# Patient Record
Sex: FEMALE | Race: WHITE | NOT HISPANIC OR LATINO | Employment: STUDENT | URBAN - METROPOLITAN AREA
[De-identification: names, ages, dates, MRNs, and addresses within clinical notes are randomized per-mention and may not be internally consistent; named-entity substitution may affect disease eponyms.]

---

## 2024-03-09 ENCOUNTER — HOSPITAL ENCOUNTER (EMERGENCY)
Facility: CLINIC | Age: 19
Discharge: HOME | End: 2024-03-10
Attending: EMERGENCY MEDICINE | Admitting: EMERGENCY MEDICINE
Payer: MEDICAID

## 2024-03-09 DIAGNOSIS — F41.0 ANXIETY ATTACK: ICD-10-CM

## 2024-03-09 LAB
HOLD SPECIMEN: NORMAL

## 2024-03-09 PROCEDURE — 99285 EMERGENCY DEPT VISIT HI MDM: CPT | Performed by: EMERGENCY MEDICINE

## 2024-03-09 PROCEDURE — 99284 EMERGENCY DEPT VISIT MOD MDM: CPT | Mod: FS | Performed by: EMERGENCY MEDICINE

## 2024-03-09 PROCEDURE — 93005 ELECTROCARDIOGRAM TRACING: CPT | Performed by: EMERGENCY MEDICINE

## 2024-03-09 PROCEDURE — 250N000013 HC RX MED GY IP 250 OP 250 PS 637

## 2024-03-09 PROCEDURE — 93010 ELECTROCARDIOGRAM REPORT: CPT

## 2024-03-09 RX ORDER — HYDROXYZINE HYDROCHLORIDE 25 MG/1
25 TABLET, FILM COATED ORAL ONCE
Status: COMPLETED | OUTPATIENT
Start: 2024-03-09 | End: 2024-03-09

## 2024-03-09 RX ORDER — HYDROXYZINE HYDROCHLORIDE 25 MG/1
25 TABLET, FILM COATED ORAL EVERY 6 HOURS PRN
Qty: 30 TABLET | Refills: 0 | Status: SHIPPED | OUTPATIENT
Start: 2024-03-09

## 2024-03-09 RX ADMIN — HYDROXYZINE HYDROCHLORIDE 25 MG: 25 TABLET, FILM COATED ORAL at 21:14

## 2024-03-09 ASSESSMENT — COLUMBIA-SUICIDE SEVERITY RATING SCALE - C-SSRS
3. HAVE YOU BEEN THINKING ABOUT HOW YOU MIGHT KILL YOURSELF?: YES
5. HAVE YOU STARTED TO WORK OUT OR WORKED OUT THE DETAILS OF HOW TO KILL YOURSELF? DO YOU INTEND TO CARRY OUT THIS PLAN?: NO
6. HAVE YOU EVER DONE ANYTHING, STARTED TO DO ANYTHING, OR PREPARED TO DO ANYTHING TO END YOUR LIFE?: NO
1. IN THE PAST MONTH, HAVE YOU WISHED YOU WERE DEAD OR WISHED YOU COULD GO TO SLEEP AND NOT WAKE UP?: YES
2. HAVE YOU ACTUALLY HAD ANY THOUGHTS OF KILLING YOURSELF IN THE PAST MONTH?: YES
4. HAVE YOU HAD THESE THOUGHTS AND HAD SOME INTENTION OF ACTING ON THEM?: YES

## 2024-03-09 ASSESSMENT — ACTIVITIES OF DAILY LIVING (ADL)
ADLS_ACUITY_SCORE: 33
ADLS_ACUITY_SCORE: 35

## 2024-03-10 VITALS
DIASTOLIC BLOOD PRESSURE: 58 MMHG | RESPIRATION RATE: 16 BRPM | OXYGEN SATURATION: 98 % | HEART RATE: 75 BPM | TEMPERATURE: 98.1 F | SYSTOLIC BLOOD PRESSURE: 102 MMHG

## 2024-03-10 PROBLEM — F41.0 PANIC ATTACK: Status: ACTIVE | Noted: 2024-03-10

## 2024-03-10 ASSESSMENT — ACTIVITIES OF DAILY LIVING (ADL): ADLS_ACUITY_SCORE: 35

## 2024-03-10 NOTE — CONSULTS
"Diagnostic Evaluation Consultation  Crisis Assessment    Patient Name: Ke Newton  Age:  18 year old  Legal Sex: female  Gender Identity: female  Pronouns:   Race: White  Ethnicity: Not  or   Language: English      Patient was assessed: Virtual: [employer] Crisis Assessment Start Time: 2356 Crisis Assessment Stop Time: 0040  Patient location: [facility] EMERGENCY DEPARTMENT                                 Referral Data and Chief Complaint  Ke Nweton presents to the ED by  self. Patient is presenting to the ED for the following concerns: Anxiety.   Factors that make the mental health crisis life threatening or complex are:  Pt presents to the ED with anxiety and chest pain. She reports having a panic attack today due to roommate problems. She states that her anxiety worsening for the past several months is most notably due to being away from her family while she is at college.  Pt reports that overall sleep is okay however sometimes she has nausea when anxious, which causes difficulty sleeping.  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 She reports having a low appetite however is eating enough to maintain nutrition.  She uses alcohol approximately 2x per month and denies binge drinking.  She denies SIB, HI, and psychosis.  She endorses thoughts about wishing to escape when she has panic attacks, however denies SI, stating that she does not want to die.  She denies plan or intent of suicide.  Pt states that she generally gets along with her 2 roommates, however today they made her \"feel bad about myself\".  She states that she thinks the worse case scenario and sometimes feels shut out by her roommates.  Her family resides in [place], [place] and she reports they are supportive. She identifies her mother as someone she can call in a crisis for help.      Informed Consent " and Assessment Methods  Explained the crisis assessment process, including applicable information disclosures and limits to confidentiality, assessed understanding of the process, and obtained consent to proceed with the assessment.  Assessment methods included conducting a formal interview with patient, review of medical records, collaboration with medical staff, and obtaining relevant collateral information from family and community providers when available.  : done     Patient response to interventions: acceptance expressed, verbalizes understanding  Coping skills were attempted to reduce the crisis:  Pt called her mother prior to coming to the ED.  Pt came to the ED alone for help. She reports that going for walks helps.     History of the Crisis   Pt has no history of ED encounters for mental health symptoms. She has never been hospitalized for mental health. She recently started working with a therapist at [facility] and has had 3 sessions. She would like to work with another community therapist instead. She is prescribed lexapro by her PCP and wants to continue working with her PCP for medication management.  She reports taking medication as prescribed.      Brief Psychosocial History  Family:  Single, Children no  Support System:  Parent(s)  Employment Status:  employed part-time  Source of Income:  salary/wages  Financial Environmental Concerns:  none  Current Hobbies:  exercise/fitness       Significant Clinical History  Current Anxiety Symptoms:  panic attack, excessive worry, anxious (Arrives to the ED with chest pain)  Current Depression/Trauma:   (thoughts about wishing to escape, during panic attacks)  Current Somatic Symptoms:  anxious, excessive worry  Current Psychosis/Thought Disturbance:     Current Eating Symptoms:  loss of appetite  Chemical Use History:  Alcohol: Social  Benzodiazepines: None  Opiates: None  Cocaine: None  Marijuana: None  Other Use: None   Past diagnosis:  Anxiety  "Disorder  Family history:  No known history of mental health or chemical health concerns  Past treatment:  Individual therapy, Primary Care  Details of most recent treatment:  Currently working with a therapist and PCP         Collateral Information  Is there collateral information: Yes     Collateral information name, relationship, phone number:  Anisha Newton, mother  [phone]    What happened today: She has been struggling with some situations with roommates. When they present things to her it is two against one. It leads to Ke over thinking, which turns into an anxiety attack. She perseverates on what they say and creates worse case scenarios. That causes her to spiral into panic attacks. This is the 4th or 5th incident this has happened and she calls me when it does.  She is very open and I appreciate her honesty. On campus she meets with a counselor every few weeks. She is very transparent with her feelings and will tell us if she feels unsafe.     What is different about patient's functioning: no changes noted     Concern about alcohol/drug use:  no     Has patient made comments about wanting to kill themselves/others: no    If d/c is recommended, can they take part in safety/aftercare planning:  yes    Additional collateral information:  She is a 4.0 student and has a job.     Risk Assessment  [place] Suicide Severity Rating Scale Full Clinical Version: 3/10/24  Suicidal Ideation  Q6 Suicide Behavior (Lifetime): no          [place] Suicide Severity Rating Scale Recent:  3/10/24  Suicidal Ideation (Recent)  Q1 Wished to be Dead (Past Month): yes  Q2 Suicidal Thoughts (Past Month): no (\"I wouldn't say they are suicidal thoughts. I want to escape. I don't want to die.\")  Q3 Suicidal Thought Method: no  Q4 Suicidal Intent without Specific Plan: no  Q5 Suicide Intent with Specific Plan: no  Level of Risk per Screen: low risk  Intensity of Ideation (Recent)  Most Severe Ideation Rating (Past 1 Month): 3 " "(\"when panicking, it's at a 3. I know I wouldn't actually do something. The thoughts in my mind occur such as life would be easier if this happened.\")  Frequency (Past 1 Month): Less than once a week (Mostly only have thoughts like that when I get panic attacks. This semester I have had two panic attacks.)  Duration (Past 1 Month):  (Comes in spurts.)  Controllability (Past 1 Month): Easily able to control thoughts (I don't categorize myself as suicidal. I don't want to die. I know there is stuff to live for. At the moment I just want to escape. I'm able to control myself and not do anything. I know my family cares about me. I'm able to control it.\")  Deterrents (Past 1 Month): Deterrents definitely stopped you from attempting suicide  Reasons for Ideation (Past 1 Month):  (panic attacks)  Suicidal Behavior (Recent)  Actual Attempt (Past 3 Months): No  Has subject engaged in non-suicidal self-injurious behavior? (Past 3 Months): No  Interrupted Attempts (Past 3 Months): No  Aborted or Self-Interrupted Attempt (Past 3 Months): No  Preparatory Acts or Behavior (Past 3 Months): No    Environmental or Psychosocial Events: geographic isolation from supports, other (see comment) (stress involving roommates)  Protective Factors: Protective Factors: strong bond to family unit, community support, or employment, lives in a responsibly safe and stable environment, sense of importance of health and wellness, able to access care without barriers, supportive ongoing medical and mental health care relationships, help seeking, good problem-solving, coping, and conflict resolution skills, sense of belonging, optimistic outlook - identification of future goals, sense of self-efficacy and/or positive self-esteem    Does the patient have thoughts of harming others? Feels Like Hurting Others: no  Previous Attempt to Hurt Others: no  Is the patient engaging in sexually inappropriate behavior?: no    Is the patient engaging in sexually " inappropriate behavior?  no        Mental Status Exam   Affect: Appropriate  Appearance: Appropriate  Attention Span/Concentration: Attentive  Eye Contact: Engaged    Fund of Knowledge: Appropriate   Language /Speech Content: Fluent  Language /Speech Volume: Normal  Language /Speech Rate/Productions: Normal  Recent Memory: Intact  Remote Memory: Intact  Mood: Normal  Orientation to Person: Yes   Orientation to Place: Yes  Orientation to Time of Day: Yes  Orientation to Date: Yes     Situation (Do they understand why they are here?): Yes  Psychomotor Behavior: Normal  Thought Content: Clear  Thought Form: Intact        Medication  Psychotropic medications:   Medication Orders - Psychiatric (From admission, onward)      Start     Dose/Rate Route Frequency Ordered Stop    03/09/24 0000  hydrOXYzine HCl (ATARAX) 25 MG tablet         25 mg Oral EVERY 6 HOURS PRN 03/09/24 3798               Current Care Team  Patient Care Team:  No Ref-Primary, Physician as PCP - General  [facility], therapist as Licensed Mental Health Professional (Licensed Mental Health)  Susannah García APNP as Nurse Practitioner    Diagnosis  Patient Active Problem List   Diagnosis Code    Panic attack F41.0       Primary Problem This Admission  Active Hospital Problems   F41.0 *Panic attack        Clinical Summary and Substantiation of Recommendations   After therapeutic assessment, intervention and aftercare planning by ED care team and LM and in consultation with attending provider, the patient's circumstances and mental state were appropriate for outpatient management. It is the recommendation of this clinician that pt discharge with OP MH support. A this time the pt is not presenting as an acute risk to self or others due to the following factors: She denies SI plan or intent. She denies HI, SIB, and psychosis. She feels safe discharging and plans to spend time with her parents this weekend. She has future oriented thinking and identifies  goals. She would like to work with outpatient resources.  She has a therapist at [facility] that she has met with for 3 sessions. She would like to work with a different therapist in the community and [facility] coordinators will follow up with her.  We were unable to schedule a mental health therapy appointment during the DEC assessment due to pt's medical insurance. She declined a referral to psychiatry as she would like to continue working with her PCP for medication management. She reports taking medication as prescribed. She developed a safety plan and agrees to follow it.        Patient coping skills attempted to reduce the crisis:  Pt called her mother prior to coming to the ED.  Pt came to the ED alone for help. She reports that going for walks helps.    Disposition  Recommended disposition: Individual Therapy, Medication Management        Reviewed case and recommendations with attending provider. Attending Name: Dr. Mcclure       Attending concurs with disposition: yes       Patient and/or validated legal guardian concurs with disposition:   yes       Final disposition:  discharge    Legal status on admission: Voluntary/Patient has signed consent for treatment    Assessment Details   Total duration spent with the patient: 44 min     CPT code(s) utilized: 78451 - Psychotherapy for Crisis - 60 (30-74*) min    EARL Collier, Psychotherapist  DEC - Triage & Transition Services  Callback: [phone]

## 2024-03-10 NOTE — ED PROVIDER NOTES
"ED Provider Note  [facility]      History     Chief Complaint   Patient presents with    Nausea    Anxiety    Suicidal     The history is provided by the patient and medical records. No  was used.     Ke Newton is a 18 year old female who presents to the ED with complaint of acute anxiety and chest pain.  Past medical history notable for anxiety currently on Lexapro 20 mg daily.  She presents to the ED very tearful with complaints of chest heaviness as well as increased anxiety.  She states that her anxiety has been worsening significantly over the past 3 months.  She reports some passive suicidal thoughts stating \"I just feel that sometimes it be better if this was just all over.\"  She denies any specific plan or timeline stating \"I am too afraid to hurt myself or to try and kill myself.\"  She states that her anxiety worsening for the past several months is most notably due to being away from her family while she is at college here at the [employer] as well as difficult relationships with her friends here at school.  She states that she does use alcohol socially but denies daily drinking.  She denies tobacco use or illicit drug use; she states she does use marijuana rarely.  She reports some rhinorrhea recently but denies any other URI symptoms.  She denies any shortness of air or sharp stabbing chest pain.  She denies any HI.  When discussing the evaluation plan while here in the ED including the DEC assessment as well as trying an as needed medication for her anxiety she continuously repeats \"I do not want to be sent anywhere or be on the psych shine.\"  She appears to even more anxious and tearful when thinking about being admitted or transferred somewhere else but patient was reassured that no decisions will be made until discussion with the DEC  and ED provider about the best disposition for her at this time.    Past Medical History  History reviewed. No " pertinent past medical history.  History reviewed. No pertinent surgical history.  hydrOXYzine HCl (ATARAX) 25 MG tablet      No Known Allergies  Family History  History reviewed. No pertinent family history.  Social History       Past medical history, past surgical history, medications, allergies, family history, and social history were reviewed with the patient. No additional pertinent items.      A medically appropriate review of systems was performed with pertinent positives and negatives noted in the HPI, and all other systems negative.    Physical Exam   BP: (!) 150/94  Pulse: (!) 121  Temp: 98  F (36.7  C)  Resp: 16  SpO2: 95 %  Physical Exam  Constitutional:       General: She is in acute distress.      Appearance: Normal appearance. She is not ill-appearing, toxic-appearing or diaphoretic.   HENT:      Mouth/Throat:      Mouth: Mucous membranes are moist.      Pharynx: Oropharynx is clear.   Cardiovascular:      Rate and Rhythm: Regular rhythm. Tachycardia present.      Pulses: Normal pulses.   Pulmonary:      Effort: Pulmonary effort is normal. No respiratory distress.      Breath sounds: Normal breath sounds.   Abdominal:      General: Abdomen is flat.      Palpations: Abdomen is soft.   Skin:     General: Skin is warm.      Capillary Refill: Capillary refill takes less than 2 seconds.   Neurological:      General: No focal deficit present.      Mental Status: She is alert and oriented to person, place, and time.   Psychiatric:         Attention and Perception: Attention and perception normal. She does not perceive auditory or visual hallucinations.         Mood and Affect: Mood is anxious. Affect is tearful.         Speech: Speech normal.         Behavior: Behavior normal. Behavior is cooperative.         Thought Content: Thought content does not include homicidal or suicidal ideation. Thought content does not include homicidal or suicidal plan.         Cognition and Memory: Cognition and memory normal.   "       Judgment: Judgment normal.      Comments: No active or passive suicidal ideation on initial interview; patient continues to state \"I feel as though I need to escape from my stressors and I feel like I need to be kidnapped.\"           ED Course, Procedures, & Data      Procedures            EKG Interpretation:      Interpreted by Cinthia Bansal PA-C  Time reviewed: 2115  Symptoms at time of EKG: chest pain, anxiety   Rhythm: normal sinus   Rate: normal  Axis: normal  Ectopy: none  Conduction: normal  ST Segments/ T Waves: No ST-T wave changes  Q Waves: none  Comparison to prior: No old EKG available    Clinical Impression: normal EKG        Results for orders placed or performed during the hospital encounter of 03/09/24   [place] Draw     Status: None    Narrative    The following orders were created for panel order [place] Draw.  Procedure                               Abnormality         Status                     ---------                               -----------         ------                     Extra Blue Top Tube[[id number]]                              Final result               Extra Red Top Tube[[id number]]                               Final result               Extra Green Top (Lithium...[[id number]]                      Final result               Extra Purple Top Tube[[id number]]                            Final result                 Please view results for these tests on the individual orders.   Extra Blue Top Tube     Status: None   Result Value Ref Range    Hold Specimen JIC    Extra Red Top Tube     Status: None   Result Value Ref Range    Hold Specimen JIC    Extra Green Top (Lithium Heparin) Tube     Status: None   Result Value Ref Range    Hold Specimen JIC    Extra Purple Top Tube     Status: None   Result Value Ref Range    Hold Specimen JIC    EKG 12-lead, tracing only     Status: None (Preliminary result)   Result Value Ref Range    Systolic Blood Pressure  mmHg    Diastolic Blood " Pressure  mmHg    Ventricular Rate 77 BPM    Atrial Rate 77 BPM    TN Interval 132 ms    QRS Duration 74 ms     ms    QTc 452 ms    P Axis -18 degrees    R AXIS -24 degrees    T Axis 0 degrees    Interpretation ECG       Sinus rhythm  Voltage criteria for left ventricular hypertrophy  Abnormal ECG       Medications   hydrOXYzine HCl (ATARAX) tablet 25 mg (25 mg Oral $Given 3/9/24 5305)     Labs Ordered and Resulted from Time of ED Arrival to Time of ED Departure - No data to display  No orders to display          Critical care was not performed.     Medical Decision Making  The patient's presentation was of moderate complexity (a chronic illness mild to moderate exacerbation, progression, or side effect of treatment).    The patient's evaluation involved:  review of external note(s) from 1 sources (outpatient telemedicine encounters for medication management)  ordering and/or review of 3+ test(s) in this encounter (see separate area of note for details)  discussion of management or test interpretation with another health professional (DEC )    The patient's management necessitated moderate risk (prescription drug management including medications given in the ED) and further care after sign-out to Dr. Stewart (see their note for further management).    Assessment & Plan    Ke is a 18-year-old female that presented to the ED with complaints of chest discomfort in the setting of anxiety and panic attack.  Patient in some acute distress on presentation and tearful due to reported anxiety but otherwise nontoxic-appearing.  Vital signs notable for pulse of 121 and elevation of blood pressure at 150/94.  Repeat vital signs pending and scheduled for every 2 hours while we await DEC assessment.  EKG regular sinus rate and rhythm with moderate artifact.  P.o. hydroxyzine in the ED for acute anxiety.  DEC assessment scheduled for 1 AM via telemedicine.  Per patient's clinical presentation, recommended  "disposition at this time would be discharged home with prescription for as needed hydroxyzine for acute anxiety attacks as well as recommendations from DEC for med management team.     Patient care was transferred to ED staff physician while awaiting DEC assessment.    I have reviewed the nursing notes. I have reviewed the findings, diagnosis, plan and need for follow up with the patient.    New Prescriptions    HYDROXYZINE HCL (ATARAX) 25 MG TABLET    Take 1 tablet (25 mg) by mouth every 6 hours as needed for anxiety       Final diagnoses:   Anxiety attack     VIVIANA Flores  [facility] EMERGENCY DEPARTMENT  3/9/2024     Cinthia Bansal PA-C  03/09/24 2231  -----------------------------------------------------------------------------------------------------------    ED Attending Physician Attestation    I Maylin Stewart MD, cared for this patient with the Advanced Practice Provider (DENIZ). I have performed a history and physical examination of the patient independent of the DENIZ. I reviewed the DENIZ's documentation above and agree with the documented findings and plan of care. I personally provided a substantive portion of the care for this patient, including the complete Medical Decision Making. Please see the DENIZ's documentation for full details.    Summary of HPI, PE, ED Course   Patient is a 18 year old female evaluated in the emergency department for anxiety and panic attack.  Patient reports significant anxiety recently which has been worsening, and she reports that this is particularly worsened with perceived conflicts with her roommates.  She recognizes that \"my head goes to the worst case scenario\" and she frequently ruminates about not being liked, not being included in friends, or her roommates being \"cold\" toward her.  She endorses some thoughts of wanting to escape but denies any specific suicidal ideation or homicidal ideation.  She states that " sometimes she believes that things would just be easier if she did not have to deal with her current problems, but again denies any specific plan or wish to die.  She was given a dose of hydroxyzine here in the emergency department.  Currently is waiting DEC assessment.  Expect that after DEC assessment, she will likely be discharged with instructions for follow-up with therapy and likely referral for medication management with a local provider (currently at her primary care provider at home in [place] is managing her medications and I suspect it would be beneficial to her if she had a local provider particularly if medication changes are going to be necessary).  Patient will be signed out to overnight physician to follow-up on DEC recommendations.        Medical Decision Making    I agree with the MDM as above documented by the DENIZ.    Maylin Stewart MD  Emergency Medicine          Terry, Maylin Matias MD  03/09/24 0209

## 2024-03-10 NOTE — DISCHARGE INSTRUCTIONS
Please review your safety plan.      A staff from [facility] providers will follow up with you. If you need assistance scheduling a therapy or psychiatry appointment, you may call the [facility] coordinators at [phone].

## 2024-03-10 NOTE — ED NOTES
The pt was accepted at shift change sign out pending mental health evaluation. Per , the pt has no SI plan or intent. Only has vague SI during panic attacks.  feels pt safe to discharge. The pt has therapist, but might be interested in a new therapist - P will try to schedule. She reports that she already has a medication prescriber, and would like to stick with that provider. She is encouraged to return with new or worsening symptoms or any other concerns.         Nuzhat Mcclure MD  03/10/24 0056

## 2024-03-10 NOTE — ED TRIAGE NOTES
Pt arrives ambulatory to triage c/o CP r/t anxiety that began this morning. Pt does endorse suicidal ideation without a specific plan. Does take Lexapro.      Triage Assessment (Adult)       Row Name 03/09/24 2001          Triage Assessment    Airway WDL WDL        Respiratory WDL    Respiratory WDL WDL        Cardiac WDL    Cardiac WDL WDL        Peripheral/Neurovascular WDL    Peripheral Neurovascular WDL WDL        Cognitive/Neuro/Behavioral WDL    Cognitive/Neuro/Behavioral WDL WDL

## 2024-03-11 LAB
ATRIAL RATE - MUSE: 77 BPM
DIASTOLIC BLOOD PRESSURE - MUSE: NORMAL MMHG
INTERPRETATION ECG - MUSE: NORMAL
P AXIS - MUSE: -18 DEGREES
PR INTERVAL - MUSE: 132 MS
QRS DURATION - MUSE: 74 MS
QT - MUSE: 400 MS
QTC - MUSE: 452 MS
R AXIS - MUSE: -24 DEGREES
SYSTOLIC BLOOD PRESSURE - MUSE: NORMAL MMHG
T AXIS - MUSE: 0 DEGREES
VENTRICULAR RATE- MUSE: 77 BPM

## 2024-03-27 ENCOUNTER — HOSPITAL ENCOUNTER (EMERGENCY)
Facility: CLINIC | Age: 19
Discharge: HOME | End: 2024-03-27
Attending: EMERGENCY MEDICINE | Admitting: EMERGENCY MEDICINE
Payer: MEDICAID

## 2024-03-27 VITALS
SYSTOLIC BLOOD PRESSURE: 118 MMHG | RESPIRATION RATE: 17 BRPM | HEART RATE: 110 BPM | DIASTOLIC BLOOD PRESSURE: 80 MMHG | OXYGEN SATURATION: 99 % | TEMPERATURE: 98 F

## 2024-03-27 DIAGNOSIS — F41.9 ANXIETY: ICD-10-CM

## 2024-03-27 DIAGNOSIS — F10.920 ALCOHOLIC INTOXICATION WITHOUT COMPLICATION (H): ICD-10-CM

## 2024-03-27 DIAGNOSIS — R45.851 SUICIDAL IDEATION: ICD-10-CM

## 2024-03-27 PROBLEM — F43.22 ADJUSTMENT DISORDER WITH ANXIOUS MOOD: Status: ACTIVE | Noted: 2024-03-27

## 2024-03-27 LAB — ALCOHOL BREATH TEST: 0.08 (ref 0–0.01)

## 2024-03-27 PROCEDURE — 99284 EMERGENCY DEPT VISIT MOD MDM: CPT | Performed by: EMERGENCY MEDICINE

## 2024-03-27 PROCEDURE — 99283 EMERGENCY DEPT VISIT LOW MDM: CPT | Performed by: EMERGENCY MEDICINE

## 2024-03-27 RX ORDER — ACETAMINOPHEN 500 MG
500-1000 TABLET ORAL EVERY 6 HOURS PRN
COMMUNITY
Start: 2024-03-15

## 2024-03-27 RX ORDER — ESCITALOPRAM OXALATE 20 MG/1
1 TABLET ORAL EVERY MORNING
COMMUNITY
Start: 2024-01-10 | End: 2025-01-09

## 2024-03-27 RX ORDER — NORGESTREL-ETHINYL ESTRADIOL 0.3-0.03MG
1 TABLET ORAL EVERY MORNING
COMMUNITY

## 2024-03-27 RX ORDER — TRETINOIN 0.5 MG/G
CREAM TOPICAL AT BEDTIME
COMMUNITY
Start: 2024-01-10

## 2024-03-27 RX ORDER — IBUPROFEN 200 MG
600-800 TABLET ORAL EVERY 8 HOURS PRN
COMMUNITY
Start: 2024-03-15

## 2024-03-27 ASSESSMENT — ACTIVITIES OF DAILY LIVING (ADL)
ADLS_ACUITY_SCORE: 35

## 2024-03-27 NOTE — ED NOTES
3/27/2024  Ke Newton 2005     Writer consulted with ED provider, Gena Madrigal,  on this date at  05:40 AM. It was determined that pt would not benefit from assessment at this time due to Pt unable able to participate in assessment    ED will call DEC at [phone] when pt is ready and able to participate in assessment.       Rolando Smith

## 2024-03-27 NOTE — ED NOTES
Patient given discharge instructions/paperwork. Discharge home with patient's mother, plans to follow up with PCP

## 2024-03-27 NOTE — ED TRIAGE NOTES
Patient was found on the bathroom floor, appeared to be having an anxiety attack. Friends called EMS, patient also endorsed SI and ETOH use tonight per EMS. Received 5 mg IM droperidol at 0140. 4 mg IV zofran. Patient reports increased anxiety this year, patient does not respond when asked SI questions.     Triage Assessment (Adult)       Row Name 03/27/24 0243          Triage Assessment    Airway WDL WDL        Respiratory WDL    Respiratory WDL WDL        Skin Circulation/Temperature WDL    Skin Circulation/Temperature WDL WDL        Cardiac WDL    Cardiac WDL WDL        Peripheral/Neurovascular WDL    Peripheral Neurovascular WDL WDL        Cognitive/Neuro/Behavioral WDL    Cognitive/Neuro/Behavioral WDL WDL

## 2024-03-27 NOTE — ED NOTES
Bed: [facility]B  Expected date:   Expected time:   Means of arrival:   Comments:  Dayday 439 18 F anxiety/SI

## 2024-03-27 NOTE — CONSULTS
"Diagnostic Evaluation Consultation  Crisis Assessment    Patient Name: Ke Newton  Age:  18 year old  Legal Sex: female  Gender Identity: female  Pronouns:   Race: White  Ethnicity: Not  or   Language: English      Patient was assessed: Virtual: iPad   Crisis Assessment Start Time: 856   Crisis Assessment Stop Time: 926  Patient location: [facility] EMERGENCY DEPARTMENT                                 Referral Data and Chief Complaint  Ke Newton presents to the ED via EMS. Patient is presenting to the ED for the following concerns: Anxiety, Substance use.   Factors that make the mental health crisis life threatening or complex are:  Pt comes to the ED after having a panic attack while drinking, pt reports she drank 3 seltzers. Patient was in the ED recently on 3/9 for a panic attack and similar stressors, though was not drinking at that time. Patient reports drinking approximately 2x per month, and drinking seltzers or mixed drinks, but does not endorse binge drinking. When patient came to the ED her alcohol breath test was 0.08. Patient says last night she was hanging out with friends, \"going out\" and \"towards the end of the night felt like I could not breathe, had a panic attack\". Patient describes her panic attacks as \"not being able to talk myself down, knowing everything is okay but my body won't calm down\" and \"scary\" intrusive thoughts, including wondering \"what if I \". Patient does not endorse suicidal thoughts or ideation, however. She says, \"I don't want to die, but I want to find a way to get rid of the anxiety...I feel like I have control over these thoughts\".  Patient reports a trigger for anxiety and panic attack is \"roommate troubles\", and describes feeling left out by 2 of the girls, says this is hurtful but she does not know if it is intentional or not and does not want to start drama, so has not brought up her concerns to the roommates. Patient reports " "previously a stressor being her boss, however pt quit her job in the last couple of weeks due to \"a combination of anxiety, school, and that the school year is almost over\". Patient reports her appetite to be normal, and reports recent struggles falling asleep earlier than 12am, but endorses PRN hydroxyzine to be helpful. Patient reports medication compliance with Lexapro 20mg. Patient identifies healthy hobbies and coping methods, including going to the gym, taking a walk, taking a long shower, and listening to music. Pt does not endorse AH/VH/SIB/HI.      Informed Consent and Assessment Methods  Explained the crisis assessment process, including applicable information disclosures and limits to confidentiality, assessed understanding of the process, and obtained consent to proceed with the assessment.  Assessment methods included conducting a formal interview with patient, review of medical records, collaboration with medical staff, and obtaining relevant collateral information from family and community providers when available.  : done     Patient response to interventions: acceptance expressed, verbalizes understanding  Coping skills were attempted to reduce the crisis:  Pt called her mother (who arrived to the ED) prior to coming to the ED.  Has many healthy coping skills.     History of the Crisis   Pt was in [place] ED on 3/9 for a panic attack with similar presentation to this encounter. She has never been hospitalized for mental health. Patient does not endorse having a therapist currently, but is wanting to get established with a therapy provider. She is prescribed lexapro 20mg and PRN hydroxizine by her PCP in [place], [place]. Patient is a sophomore at the  of  studying [employer]. She is not employed. She lives in off campus apartment housing in [place] with 3 other roommates, and travels home to [place] once a month or less, where her mom, dad, and younger brother and sister " live. Patient has [facility] for insurance so ran into barriers with finding a therapist to see her while in school at [employer].    Brief Psychosocial History  Family:   , Children no  Support System:  Parent(s)  Employment Status:  unemployed, student  Source of Income:  none  Financial Environmental Concerns:  insurance inadequate  Current Hobbies:  exercise/fitness, music, outdoor activities  Barriers in Personal Life:  emotional concerns    Significant Clinical History  Current Anxiety Symptoms:  panic attack, excessive worry, anxious, shortness of breath or racing heart  Current Depression/Trauma:     Current Somatic Symptoms:  anxious, excessive worry, shortness of breath or racing heart  Current Psychosis/Thought Disturbance:     Current Eating Symptoms:     Chemical Use History:  Alcohol: (S) Social (known)  Last Use:: 03/26/24  Benzodiazepines: (S) None (Known)  Opiates: (S) None (unkown)  Cocaine: (S) None (unkown)  Marijuana: None  Other Use: None   Past diagnosis:  Anxiety Disorder  Family history:  Anxiety Disorder, Depression  Past treatment:  Primary Care, School Counselor  Details of most recent treatment:  PCP for medication management. Saw a therapist at the [facility] for 3 sessions but discontinued.  Other relevant history:  Patient has [facility] for insurance so ran into barriers with finding a therapist to see her while in school at [employer].       Collateral Information  Is there collateral information:   NOLVIA SANFORD (Mother) [phone]     Pt mother reports family hx of depression and anxiety in both paternal and maternal sides of patient. Pt mom reports being an RN herself, and that she has connections to psychiatrists in [place] and can get referrals through pt's PCP, with plans to bring the patient home this weekend to establish appointments/providers.        Risk Assessment  [place] Suicide Severity Rating Scale Full Clinical Version:  Suicidal Ideation  Q6 Suicide Behavior  (Lifetime): no          [place] Suicide Severity Rating Scale Recent:   Suicidal Ideation (Recent)  Q1 Wished to be Dead (Past Month): no (denies for this writter and MD)  Q2 Suicidal Thoughts (Past Month): no  Level of Risk per Screen: no risks indicated          Environmental or Psychosocial Events: geographic isolation from supports, challenging interpersonal relationships, barriers to accessing healthcare  Protective Factors: Protective Factors: strong bond to family unit, community support, or employment, sense of importance of health and wellness, able to access care without barriers, supportive ongoing medical and mental health care relationships, help seeking, good problem-solving, coping, and conflict resolution skills, sense of belonging, optimistic outlook - identification of future goals, sense of self-efficacy and/or positive self-esteem, constructive use of leisure time, enjoyable activities, resilience    Does the patient have thoughts of harming others? Feels Like Hurting Others: no  Previous Attempt to Hurt Others: no  Is the patient engaging in sexually inappropriate behavior?: no    Is the patient engaging in sexually inappropriate behavior?  no        Mental Status Exam   Affect: Appropriate  Appearance: Appropriate  Attention Span/Concentration: Attentive  Eye Contact: Variable (Pt often with eyes closed)    Fund of Knowledge: Appropriate   Language /Speech Content: Fluent  Language /Speech Volume: Normal  Language /Speech Rate/Productions: Normal  Recent Memory: Intact  Remote Memory: Intact  Mood: Normal  Orientation to Person: Yes   Orientation to Place: Yes  Orientation to Time of Day: Yes  Orientation to Date: Yes     Situation (Do they understand why they are here?): Yes  Psychomotor Behavior: Normal  Thought Content: Clear  Thought Form: Intact       Medication  Psychotropic medications: Lexapro, Hydroxyzine  Medication Orders - Psychiatric (From admission, onward)      None              Current Care Team  Patient Care Team:  No Ref-Primary, Physician as PCP - General  Tammy, therapist as Licensed Mental Health Professional (Licensed Mental Health)  Susannah García APNP as Nurse Practitioner    Diagnosis  Patient Active Problem List   Diagnosis Code    Panic attack F41.0    Adjustment disorder with anxious mood F43.22       Primary Problem This Admission  Active Hospital Problems    Adjustment disorder with anxious mood      Panic attack        Clinical Summary and Substantiation of Recommendations   After therapeutic assessment, intervention and aftercare planning by ED care team and LM and in consultation with attending provider, the patient's circumstances and mental state were appropriate for outpatient management. It is the recommendation of this clinician that pt discharge with OP MH support. A this time the pt is not presenting as an acute risk to self or others due to the following factors: Patient does not endorse SI with plan or intent. Patient protective factors outweigh identified risk factors. Patient is help-seeking, future-oriented in thinking. Patient was given resources to mitigate issues with insurance and finding a therapy provider while in [place], and patient states she will call her insurance to identify an appriopriate outpatient psychiatrist and therapist. Patient is not presenting as an acute risk to herself or others and her sx are appropriate for management at outpatient level of care.       Patient coping skills attempted to reduce the crisis:  Pt called her mother (who arrived to the ED) prior to coming to the ED.  Has many healthy coping skills.    Disposition  Recommended disposition: Individual Therapy, Medication Management        Reviewed case and recommendations with attending provider. Attending Name: Dr. Montesinos       Attending concurs with disposition: yes       Patient and/or validated legal guardian concurs with disposition:   yes       Final  disposition:  discharge    Legal status on admission: Voluntary/Patient has signed consent for treatment    Assessment Details   Total duration spent with the patient: 30 min     CPT code(s) utilized: 74710 - Psychotherapy for Crisis - 60 (30-74*) min    Tj Chew Psychotherapist  DEC - Triage & Transition Services  Callback: [phone]

## 2024-03-27 NOTE — MEDICATION SCRIBE - ADMISSION MEDICATION HISTORY
Medication Scribe Admission Medication History    Admission medication history is complete. The information provided in this note is only as accurate as the sources available at the time of the update.    Information Source(s): Patient, Family member, and CareEverywhere/SureScripts via in-person    Pertinent Information: Patient and mother report she has completed her Augmentin 875-125 mg prescription on Friday 3/22/2024.    Changes made to PTA medication list:  Added: Acetaminophen 1000 mg QID PRN, ibuprofen 600-800 mg TID PRN, Cryselle 1 tablet daily, Tretinoin 0.05% cream apply at bedtime.  Deleted: None  Changed: Escitalopram changed to 20 mg daily.    Allergies reviewed with patient and updates made in EHR: yes    Medication History Completed By: Chandler Mcgowan MD 3/27/2024 9:59 AM    PTA Med List   Medication Sig Last Dose    acetaminophen (TYLENOL) 500 MG tablet Take 500-1,000 mg by mouth every 6 hours as needed for mild pain Past Week at unknown    CRYSELLE-28 0.3-30 MG-MCG tablet Take 1 tablet by mouth every morning 3/26/2024 at am    escitalopram (LEXAPRO) 20 MG tablet Take 1 tablet by mouth every morning 3/26/2024 at am    hydrOXYzine HCl (ATARAX) 25 MG tablet Take 1 tablet (25 mg) by mouth every 6 hours as needed for anxiety Past Week at unknown    ibuprofen (ADVIL/MOTRIN) 200 MG tablet Take 600-800 mg by mouth every 8 hours as needed for pain Past Week at unknown    tretinoin (RETIN-A) 0.05 % external cream Apply topically at bedtime 3/26/2024 at hs

## 2024-03-27 NOTE — ED PROVIDER NOTES
ED Provider Note  [facility]      History     Chief Complaint   Patient presents with    Suicidal     Patient was found on the bathroom floor, appeared to be having an anxiety attack. Friends called EMS, patient also endorsed SI and ETOH use tonight per EMS. Received 5 mg IM droperidol at 0140. 4 mg IV zofran. Patient reports increased anxiety this year, patient does not respond when asked SI questions.     HPI  Ke Newton is a 18 year old female who has medical history of anxiety presenting with anxiety and suicidal ideation.  The patient was drinking tonight and there are some reports about suicidal ideation.  She received droperidol prior to arrival.  She denies any pills tonight.  She did not cut herself.  No hallucinations or voices.  Denies other drug use.  Currently has no intent to harm herself.    Past Medical History  Past Medical History:   Diagnosis Date    Panic attack 03/10/2024     History reviewed. No pertinent surgical history.  hydrOXYzine HCl (ATARAX) 25 MG tablet      No Known Allergies  Family History  History reviewed. No pertinent family history.  Social History   Social History     Tobacco Use    Smoking status: Never    Smokeless tobacco: Never   Substance Use Topics    Alcohol use: Yes         A medically appropriate review of systems was performed with pertinent positives and negatives noted in the HPI, and all other systems negative.    Physical Exam   BP: 94/60  Pulse: 98  Temp: 98  F (36.7  C)  Resp: 18  SpO2: 99 %  Physical Exam  Physical Exam   Constitutional: oriented to person, place, and time. appears well-developed and well-nourished.   HENT:   Head: Normocephalic and atraumatic.   Neck: Normal range of motion.   Pulmonary/Chest: Effort normal. No respiratory distress.   Cardiac: No murmurs, rubs, gallops. RRR.  Abdominal: Abdomen soft, nontender, nondistended. No rebound tenderness.  MSK: Long bones without deformity or evidence of  trauma  Neurological: alert and oriented to person, place, and time..  Slurring speech.  Moving all extremities  Skin: Skin is warm and dry.   Psychiatric:  normal mood and affect.  behavior is normal. Thought content normal.       ED Course, Procedures, & Data      Procedures            No results found for any visits on 03/27/24.  Medications - No data to display  Labs Ordered and Resulted from Time of ED Arrival to Time of ED Departure - No data to display slurring speech  No orders to display          Critical care was not performed.     Medical Decision Making  The patient's presentation was of high complexity (an acute health issue posing potential threat to life or bodily function).    The patient's evaluation involved:  discussion of management or test interpretation with another health professional (mental health )    The patient's management necessitated high risk (a decision regarding hospitalization) and further care after sign-out to Dr. Montesinos (see their note for further management).    Assessment & Plan    MDM  Patient presenting with SI and etoh abuse. She was given droperidol PTA for anxiety, she will need an assessment when more awake prior to disposition. The patient will be signed out to the oncoming provider.    I have reviewed the nursing notes. I have reviewed the findings, diagnosis, plan and need for follow up with the patient.    New Prescriptions    No medications on file       Final diagnoses:   Alcoholic intoxication without complication (H24)   Suicidal ideation   Anxiety       Paolo Mccloud  [facility] EMERGENCY DEPARTMENT  3/27/2024     Paolo Mccloud MD  03/27/24 6594

## 2024-03-27 NOTE — ED PROVIDER NOTES
Emergency Department Patient Sign-out       Brief HPI:  This is a 18 year old female signed out to me by Dr. Mccloud.  See initial ED Provider note for details of the presentation.          Patient is not medically cleared for admission to a Behavioral Health unit.      The patient is not on a hold.      The patient has required medication for agitation.    Awaiting Behavioral Health Assessment (DEC)      Significant Events prior to my assuming care: A patient who presented on the earlier shift due to severe anxiety, alcohol intoxication, reporting suicidal thoughts.  Required droperidol by EMS prior to arrival.  Was medically cleared and placed in the queue for mental health assessment which is pending at the time of signout.      Exam:   Patient Vitals for the past 24 hrs:   BP Temp Temp src Pulse Resp SpO2   03/27/24 0620 99/58 97.6  F (36.4  C) Oral 84 18 97 %   03/27/24 0250 94/60 98  F (36.7  C) Oral 98 18 99 %     General: Patient is in no acute distress and is resting comfortably.  HEENT: Normocephalic atraumatic  Neck: Supple  Cardiovascular: Heart rate normal  Pulmonary: Patient is in no respiratory distress  Extremities: No signs of any significant or life-threatening trauma.  Neurologic: No new focal neurologic deficits.  Mental Status Exam:    Appearance: Appropriately groomed wearing scrubs  Attitude: Cooperative  Eye contact: Normal  Mood: Anxious  Affect: Normal  Speech: Fluent  Psychomotor behavior: No tardive dyskinesia, no dystonia, no tics  Thought Process:  logical, lnear  Associations: No loose associations  Thought content: Denies any suicidal ideation, denies any homicidal ideation,   denies any symptoms of it shows no signs of psychosis  Insight: Normal  Judgement: Normal  Orientation: Oriented x 3  Attention span: Normal  Recent/ remote memory:  intact  Language: English  Fund of knowledge:  appropriate for age  Gait and station:  normal        ED RESULTS:   No results found for this  visit on 03/27/24 (from the past 24 hour(s)).    ED MEDICATIONS:   Medications - No data to display      Impression:    ICD-10-CM    1. Alcoholic intoxication without complication (H24)  F10.920       2. Suicidal ideation  R45.851       3. Anxiety  F41.9           Plan:    Pending studies include [facility] assessment.    The patient was also seen by the [facility] , please refer to their extensive note/evaluation which was reviewed with me and is documented in EPIC on 3/27/2024 for further details.  Patient presented on the night shift after she was using alcohol, had an interaction with some other persons, and when and what she describes as a panic attack.  During that she reported some passive suicidal thoughts, no plan or intent for suicide.  Has denied any suicidal ideation during her time in the ED.  She has no signs of psychosis and she is no longer intoxicated.  Her mother is here now, she was seen approximately a month ago and was referred to outpatient resources, did follow-up once but because the patient is a resident of [facility] there was some logistical difficulties.  She is a student at the [employer].  Her mother is now here and plans to take her home after Tamazight test tomorrow.  They will work through their primary care provider in [place] to arrange a psychiatry referral and resources for outpatient therapy are provided.  The patient is able to contract for safety and makes any clear denials of thoughts to harm self or others and appears appropriate for discharge as per the recommendation.  We discussed the indications for emergency department return and follow-up.  Stable for discharge.      MD Yamel Gamez David, MD  03/27/24 1021